# Patient Record
Sex: FEMALE | Race: WHITE | NOT HISPANIC OR LATINO | Employment: UNEMPLOYED | URBAN - METROPOLITAN AREA
[De-identification: names, ages, dates, MRNs, and addresses within clinical notes are randomized per-mention and may not be internally consistent; named-entity substitution may affect disease eponyms.]

---

## 2020-01-15 ENCOUNTER — OFFICE VISIT (OUTPATIENT)
Dept: ENDOCRINOLOGY | Facility: CLINIC | Age: 35
End: 2020-01-15

## 2020-01-15 VITALS
DIASTOLIC BLOOD PRESSURE: 90 MMHG | OXYGEN SATURATION: 99 % | HEART RATE: 81 BPM | SYSTOLIC BLOOD PRESSURE: 140 MMHG | HEIGHT: 60 IN | WEIGHT: 159 LBS | BODY MASS INDEX: 31.22 KG/M2

## 2020-01-15 DIAGNOSIS — E03.8 HYPOTHYROIDISM DUE TO HASHIMOTO'S THYROIDITIS: Primary | ICD-10-CM

## 2020-01-15 DIAGNOSIS — E06.3 HYPOTHYROIDISM DUE TO HASHIMOTO'S THYROIDITIS: Primary | ICD-10-CM

## 2020-01-15 PROCEDURE — 99243 OFF/OP CNSLTJ NEW/EST LOW 30: CPT | Performed by: INTERNAL MEDICINE

## 2020-01-15 PROCEDURE — 84443 ASSAY THYROID STIM HORMONE: CPT | Performed by: INTERNAL MEDICINE

## 2020-01-15 RX ORDER — LEVOTHYROXINE SODIUM 0.12 MG/1
125 TABLET ORAL DAILY
COMMUNITY
Start: 2019-12-12 | End: 2020-03-30 | Stop reason: ALTCHOICE

## 2020-01-15 NOTE — PROGRESS NOTES
Chief Complaint   Patient presents with   • Hashimoto's Thyroiditis     Consult for Dr. Jammie Tracy        HPI:   Mary Esparza is a 34 y.o.female sent in consultation by Jammie Ellsworth MD for further evaluation of pt's hypothyroidism due to Hashimoto's thyroiditis. Her history is as follows:    1) hypothyroidism due to Hashimoto's hypothyroidism:   - diagnosed with hypothyroidism in her 20's    Current Dose: levothyroxine 125 mcg  Takes in AM with water. Waits 20 - 30 minutes before eating. No hot liquids with tablet.  Misses tablet approximately once a week  - Takes OTC MVI in the evening  - is not on high dose biotin    On further review, she has a h/o chronic joint pain. Has been evaluated by rheumatology, however, etiology was not clear.     Review of Systems   Constitutional: Negative.    HENT: Negative.    Eyes: Positive for pain, redness and itching.   Respiratory: Negative.    Cardiovascular: Positive for palpitations (occasional).   Gastrointestinal: Negative.    Endocrine:        Hot flashes   Genitourinary: Negative.    Musculoskeletal: Positive for arthralgias and joint swelling.   Skin: Negative.    Allergic/Immunologic: Negative.    Neurological: Negative.    Hematological: Negative.    Psychiatric/Behavioral: Negative.      Past Medical History:   Diagnosis Date   • Arthritis    • Connective tissue disorder (CMS/HCC)    • Hypothyroidism      family history includes Arthritis in her mother; Diabetes in her father and mother; Hyperlipidemia in her father and mother; Hypertension in her father and mother; Stroke in her father.  Past Surgical History:   Procedure Laterality Date   •  SECTION  2016     Social History     Tobacco Use   • Smoking status: Never Smoker   • Smokeless tobacco: Never Used   Substance Use Topics   • Alcohol use: Never     Frequency: Never   • Drug use: Never     Outpatient Medications Prior to Visit   Medication Sig Dispense Refill   • levothyroxine  "(SYNTHROID, LEVOTHROID) 125 MCG tablet Take 125 mcg by mouth Daily.       No facility-administered medications prior to visit.      No Known Allergies    /90   Pulse 81   Ht 152.4 cm (60\")   Wt 72.1 kg (159 lb)   SpO2 99%   BMI 31.05 kg/m²   Physical Exam   Constitutional: She is oriented to person, place, and time. She appears well-developed. No distress.   HENT:   Head: Normocephalic.   Mouth/Throat: Oropharynx is clear and moist.   Eyes: Pupils are equal, round, and reactive to light. Conjunctivae and EOM are normal.   Neck: No tracheal deviation present. No thyromegaly present.   No palpable thyroid nodules     Cardiovascular: Normal rate, regular rhythm and normal heart sounds.   No murmur heard.  Pulmonary/Chest: Effort normal and breath sounds normal. No respiratory distress.   Lymphadenopathy:     She has no cervical adenopathy.   Neurological: She is alert and oriented to person, place, and time. No cranial nerve deficit.   Skin: Skin is warm and dry. She is not diaphoretic. No erythema.   Psychiatric: She has a normal mood and affect. Her behavior is normal.   Vitals reviewed.    LABS/IMAGING: outside records reviewed and summarized in HPI  Results for orders placed or performed in visit on 01/15/20   TSH   Result Value Ref Range    TSH 3.140 0.270 - 4.200 uIU/mL     ASSESSMENT/PLAN:  1) hypothyroidism due to Hashimoto's thyroiditis:   - clinically euthyroid on exam  - TSH checked today WNL. Is in the upper end of the normal range, but this may be due to a few missed doses.  - Will have patient try Brand Unithroid 125 mcg daily to see if she notes any difference on the Brand name formulation.   - Reviewed proper thyroid hormone administration, and factors to avoid that decrease medication potency and medication absorption.     RTC 6 months    Signed: Marybeth Mendoza MD          "

## 2020-01-16 LAB — TSH SERPL DL<=0.05 MIU/L-ACNC: 3.14 UIU/ML (ref 0.27–4.2)

## 2020-03-30 ENCOUNTER — TELEPHONE (OUTPATIENT)
Dept: ENDOCRINOLOGY | Facility: CLINIC | Age: 35
End: 2020-03-30

## 2020-03-30 DIAGNOSIS — E06.3 HYPOTHYROIDISM DUE TO HASHIMOTO'S THYROIDITIS: Primary | ICD-10-CM

## 2020-03-30 DIAGNOSIS — E03.8 HYPOTHYROIDISM DUE TO HASHIMOTO'S THYROIDITIS: Primary | ICD-10-CM

## 2020-03-30 RX ORDER — LEVOTHYROXINE SODIUM 125 UG/1
125 TABLET ORAL DAILY
Qty: 90 TABLET | Refills: 1 | Status: SHIPPED | OUTPATIENT
Start: 2020-03-30 | End: 2020-07-13

## 2020-03-30 NOTE — TELEPHONE ENCOUNTER
Patient called, stating that UNITHROID 125MG is working great for her and she would like a script called into her local [facility] pharmacy in [place].

## 2020-07-13 ENCOUNTER — OFFICE VISIT (OUTPATIENT)
Dept: ENDOCRINOLOGY | Facility: CLINIC | Age: 35
End: 2020-07-13

## 2020-07-13 ENCOUNTER — LAB (OUTPATIENT)
Dept: LAB | Facility: HOSPITAL | Age: 35
End: 2020-07-13

## 2020-07-13 VITALS
DIASTOLIC BLOOD PRESSURE: 80 MMHG | HEART RATE: 78 BPM | HEIGHT: 60 IN | BODY MASS INDEX: 30.63 KG/M2 | OXYGEN SATURATION: 98 % | SYSTOLIC BLOOD PRESSURE: 122 MMHG | WEIGHT: 156 LBS

## 2020-07-13 DIAGNOSIS — E06.3 HYPOTHYROIDISM DUE TO HASHIMOTO'S THYROIDITIS: Primary | ICD-10-CM

## 2020-07-13 DIAGNOSIS — E03.8 HYPOTHYROIDISM DUE TO HASHIMOTO'S THYROIDITIS: Primary | ICD-10-CM

## 2020-07-13 PROCEDURE — 99213 OFFICE O/P EST LOW 20 MIN: CPT | Performed by: INTERNAL MEDICINE

## 2020-07-13 PROCEDURE — 84443 ASSAY THYROID STIM HORMONE: CPT | Performed by: INTERNAL MEDICINE

## 2020-07-13 RX ORDER — LEVOTHYROXINE SODIUM 0.12 MG/1
125 TABLET ORAL DAILY
Qty: 90 TABLET | Refills: 1
Start: 2020-07-13 | End: 2020-07-26 | Stop reason: SDUPTHER

## 2020-07-13 NOTE — PROGRESS NOTES
"Chief Complaint   Patient presents with   • Hypothyroidism due to Hashimotos thyroiditis     f/u        HPI:   Mary Esparza is a 35 y.o.female who [facility]u evaluation of pt's hypothyroidism due to Hashimoto's thyroiditis. Last visit 01/15/2020. Her history is as follows:    Interim Events:  - pt states she has not been able to get the Brand Unithroid from her pharmacy due to cost.   - no new medical problems, no new medications or supplements    1) hypothyroidism due to Hashimoto's hypothyroidism:   - diagnosed with hypothyroidism in her 20's    Current Dose: generic levothyroxine 125 mcg  Takes in AM with water. Waits 20 - 30 minutes before eating. No hot liquids with tablet.  - no missed doses  - Takes OTC MVI in the evening  - is not on high dose biotin    On further review, she has a h/o chronic joint pain. Has been evaluated by rheumatology at , however, etiology was not clear.     Review of Systems   Constitutional: Negative.    HENT: Negative.    Eyes: Positive for pain, redness and itching.   Respiratory: Negative.    Cardiovascular: Negative for palpitations.   Gastrointestinal: Negative.    Endocrine:        Hot flashes   Genitourinary: Negative.    Musculoskeletal: Positive for arthralgias and joint swelling.   Skin: Negative.    Allergic/Immunologic: Negative.    Neurological: Negative.    Hematological: Negative.    Psychiatric/Behavioral: Negative.      The following portions of the patient's history were reviewed and updated as appropriate: allergies, current medications, past family history, past medical history, past social history, past surgical history and problem list.    /80   Pulse 78   Ht 152.4 cm (60\")   Wt 70.8 kg (156 lb)   SpO2 98%   BMI 30.47 kg/m²   Physical Exam   Constitutional: She is oriented to person, place, and time. She appears well-developed. No distress.   HENT:   Head: Normocephalic.   Mouth/Throat: Oropharynx is clear and moist.   Eyes: " Pupils are equal, round, and reactive to light. Conjunctivae and EOM are normal.   Neck: No tracheal deviation present. Thyromegaly (mild) present.   No palpable thyroid nodules     Cardiovascular: Normal rate, regular rhythm and normal heart sounds.   No murmur heard.  Pulmonary/Chest: Effort normal and breath sounds normal. No respiratory distress.   Lymphadenopathy:     She has no cervical adenopathy.   Neurological: She is alert and oriented to person, place, and time. No cranial nerve deficit.   Skin: Skin is warm and dry. She is not diaphoretic. No erythema.   Psychiatric: She has a normal mood and affect. Her behavior is normal.   Vitals reviewed.    LABS/IMAGING: outside records reviewed and summarized in HPI  Results for orders placed or performed in visit on 07/13/20   TSH   Result Value Ref Range    TSH 1.250 0.270 - 4.200 uIU/mL     ASSESSMENT/PLAN:  1) hypothyroidism due to Hashimoto's thyroiditis:   - clinically euthyroid on exam  - TSH checked today WNL.   - Continue with generic levothyroxine 125 mcg daily. Offered changing to Brand Levoxyl, pt okay continuing with levothyroxine.   - Reviewed proper thyroid hormone administration, and factors to avoid that decrease medication potency and medication absorption.     RTC 6 months    Counseling was given to patient for the following topics:  instructions for management and risks and benefits of treatment options, see details in assessment/plan. Total face to face time of the encounter was 15 minutes and 10 minutes was spent counseling.    Signed: Marybeth Mendoza MD

## 2020-07-14 LAB — TSH SERPL DL<=0.05 MIU/L-ACNC: 1.25 UIU/ML (ref 0.27–4.2)

## 2020-07-26 ENCOUNTER — TELEPHONE (OUTPATIENT)
Dept: ENDOCRINOLOGY | Facility: CLINIC | Age: 35
End: 2020-07-26

## 2020-07-26 RX ORDER — LEVOTHYROXINE SODIUM 0.12 MG/1
125 TABLET ORAL DAILY
Qty: 90 TABLET | Refills: 1 | Status: SHIPPED | OUTPATIENT
Start: 2020-07-26 | End: 2021-01-27 | Stop reason: SDUPTHER

## 2020-07-26 NOTE — TELEPHONE ENCOUNTER
Spoke to patient about lab results. See clinic note from 07/13/2020 for details.   Signed: Marybeth Mendoza MD

## 2021-01-13 ENCOUNTER — LAB (OUTPATIENT)
Dept: LAB | Facility: HOSPITAL | Age: 36
End: 2021-01-13

## 2021-01-13 ENCOUNTER — OFFICE VISIT (OUTPATIENT)
Dept: ENDOCRINOLOGY | Facility: CLINIC | Age: 36
End: 2021-01-13

## 2021-01-13 VITALS
DIASTOLIC BLOOD PRESSURE: 78 MMHG | HEIGHT: 64 IN | SYSTOLIC BLOOD PRESSURE: 128 MMHG | HEART RATE: 84 BPM | OXYGEN SATURATION: 98 % | WEIGHT: 158 LBS | BODY MASS INDEX: 26.98 KG/M2

## 2021-01-13 DIAGNOSIS — E06.3 HYPOTHYROIDISM DUE TO HASHIMOTO'S THYROIDITIS: Primary | ICD-10-CM

## 2021-01-13 DIAGNOSIS — E03.8 HYPOTHYROIDISM DUE TO HASHIMOTO'S THYROIDITIS: Primary | ICD-10-CM

## 2021-01-13 PROCEDURE — 84443 ASSAY THYROID STIM HORMONE: CPT | Performed by: INTERNAL MEDICINE

## 2021-01-13 PROCEDURE — 99213 OFFICE O/P EST LOW 20 MIN: CPT | Performed by: INTERNAL MEDICINE

## 2021-01-13 NOTE — PROGRESS NOTES
"Chief Complaint   Patient presents with   • Hypothyroidism     follow up       HPI:   Mary Esparza is a 35 y.o.female who [facility] for f/u evaluation of pt's hypothyroidism due to Hashimoto's thyroiditis. Last visit 07/13/2020. Her history is as follows:    Interim Events:  - pt states she has not been able to get the Brand Unithroid from her pharmacy due to cost. Is okay continuing with generic levothyroxine.  - no new medical problems, no new medications or supplements    1) hypothyroidism due to Hashimoto's hypothyroidism:   - diagnosed with hypothyroidism in her 20's  - have offered changing to Brand Levoxyl, pt okay continuing with levothyroxine.     Current Dose: generic levothyroxine 125 mcg  Takes in AM with water. Waits 20 - 30 minutes before eating. No hot liquids with tablet.  - no missed doses  - Takes OTC MVI in the evening  - is not on high dose biotin    On further review, she has a h/o chronic joint pain. Has been evaluated by rheumatology at , however, etiology was not clear.     Review of Systems   Constitutional: Negative.    HENT: Negative.    Eyes: Positive for redness and itching. Negative for pain.   Respiratory: Negative.    Cardiovascular: Negative for palpitations.   Gastrointestinal: Negative.    Endocrine:        Hot flashes   Genitourinary: Negative.    Musculoskeletal: Positive for arthralgias and joint swelling.   Skin:        + facial hirsutism (mild)   Allergic/Immunologic: Negative.    Neurological: Negative.    Hematological: Negative.    Psychiatric/Behavioral: Negative.        The following portions of the patient's history were reviewed and updated as appropriate: allergies, current medications, past family history, past medical history, past social history, past surgical history and problem list.    /78   Pulse 84   Ht 162.6 cm (64\")   Wt 71.7 kg (158 lb)   SpO2 98%   BMI 27.12 kg/m²   Physical Exam   Constitutional: She is oriented to person, " place, and time. She appears well-developed. No distress.   HENT:   Head: Normocephalic.   Eyes: Pupils are equal, round, and reactive to light. Conjunctivae are normal.   Neck: No tracheal deviation present. Thyromegaly (mild) present.   No palpable thyroid nodules     Cardiovascular: Normal rate, regular rhythm and normal heart sounds.   No murmur heard.  Pulmonary/Chest: Effort normal and breath sounds normal. No respiratory distress.   Lymphadenopathy:     She has no cervical adenopathy.   Neurological: She is alert and oriented to person, place, and time. No cranial nerve deficit.   Skin: Skin is warm and dry. She is not diaphoretic. No erythema.   Few course hairs on chin, neck   Psychiatric: Her behavior is normal.   Vitals reviewed.    LABS/IMAGING: outside records reviewed and summarized in HPI  Results for orders placed or performed in visit on 01/13/21   TSH    Specimen: Blood   Result Value Ref Range    TSH 1.570 0.270 - 4.200 uIU/mL     ASSESSMENT/PLAN:  1) hypothyroidism due to Hashimoto's thyroiditis:   - clinically euthyroid on exam  - TSH checked today WNL.   - Continue with generic levothyroxine 125 mcg daily.  - Rx refilled  - Reviewed proper thyroid hormone administration, and factors to avoid that decrease medication potency and medication absorption.     RTC 12 months    Counseling was given to patient for the following topics:  instructions for management and risks and benefits of treatment options, see details in assessment/plan. Total face to face time of the encounter was 15 minutes and 10 minutes was spent counseling.    Signed: Marybeth Mendoza MD

## 2021-01-14 LAB — TSH SERPL DL<=0.05 MIU/L-ACNC: 1.57 UIU/ML (ref 0.27–4.2)

## 2021-01-27 RX ORDER — LEVOTHYROXINE SODIUM 0.12 MG/1
125 TABLET ORAL DAILY
Qty: 90 TABLET | Refills: 3 | Status: SHIPPED | OUTPATIENT
Start: 2021-01-27 | End: 2021-01-27 | Stop reason: SDUPTHER

## 2021-01-27 RX ORDER — LEVOTHYROXINE SODIUM 0.12 MG/1
125 TABLET ORAL DAILY
Qty: 90 TABLET | Refills: 3 | Status: SHIPPED | OUTPATIENT
Start: 2021-01-27 | End: 2022-01-21

## 2022-01-19 ENCOUNTER — OFFICE VISIT (OUTPATIENT)
Dept: ENDOCRINOLOGY | Facility: CLINIC | Age: 37
End: 2022-01-19

## 2022-01-19 ENCOUNTER — LAB (OUTPATIENT)
Dept: LAB | Facility: HOSPITAL | Age: 37
End: 2022-01-19

## 2022-01-19 VITALS
OXYGEN SATURATION: 98 % | HEART RATE: 78 BPM | DIASTOLIC BLOOD PRESSURE: 76 MMHG | SYSTOLIC BLOOD PRESSURE: 122 MMHG | WEIGHT: 159 LBS | BODY MASS INDEX: 27.14 KG/M2 | HEIGHT: 64 IN

## 2022-01-19 DIAGNOSIS — E03.8 HYPOTHYROIDISM DUE TO HASHIMOTO'S THYROIDITIS: Primary | ICD-10-CM

## 2022-01-19 DIAGNOSIS — E06.3 HYPOTHYROIDISM DUE TO HASHIMOTO'S THYROIDITIS: Primary | ICD-10-CM

## 2022-01-19 LAB — TSH SERPL DL<=0.05 MIU/L-ACNC: 2.42 UIU/ML (ref 0.27–4.2)

## 2022-01-19 PROCEDURE — 99213 OFFICE O/P EST LOW 20 MIN: CPT | Performed by: INTERNAL MEDICINE

## 2022-01-19 PROCEDURE — 84443 ASSAY THYROID STIM HORMONE: CPT | Performed by: INTERNAL MEDICINE

## 2022-01-19 NOTE — PROGRESS NOTES
"Chief Complaint   Patient presents with   • Hypothyroidism     follow up        HPI:   Mary Esparza is a 36 y.o.female who [facility] for f/u evaluation of pt's hypothyroidism due to Hashimoto's thyroiditis. Last visit 01/13/2021. Her history is as follows:    Interim Events:  - no new medical problems, no new medications or supplements  - Overall feeling well    1) hypothyroidism due to Hashimoto's hypothyroidism:   - diagnosed with hypothyroidism in her 20's  - have offered changing to Brand Levoxyl, pt okay continuing with levothyroxine.     Current Dose: generic levothyroxine 125 mcg  Takes in AM with water. Waits 20 - 30 minutes before eating. No hot liquids with tablet.  - no missed doses  - Takes OTC MVI in the evening  - is not on high dose biotin    On further review, she has a h/o chronic joint pain. Has been evaluated by rheumatology at , however, etiology was not clear.     Review of Systems   Constitutional: Negative.    HENT: Negative.    Eyes: Positive for itching. Negative for pain and redness.   Respiratory: Negative.    Cardiovascular: Negative for palpitations.   Gastrointestinal: Negative.    Genitourinary: Negative.    Musculoskeletal: Positive for arthralgias and joint swelling.   Skin:        + facial hirsutism (mild)   Allergic/Immunologic: Negative.    Neurological: Negative.    Hematological: Negative.    Psychiatric/Behavioral: Negative.        The following portions of the patient's history were reviewed and updated as appropriate: allergies, current medications, past family history, past medical history, past social history, past surgical history and problem list.      /76   Pulse 78   Ht 162.6 cm (64\")   Wt 72.1 kg (159 lb)   SpO2 98%   BMI 27.29 kg/m²   Physical Exam   Constitutional: She is oriented to person, place, and time. She appears well-developed. No distress.   HENT:   Head: Normocephalic.   Eyes: Pupils are equal, round, and reactive to light. " Conjunctivae are normal.   Neck: No tracheal deviation present. Thyromegaly (mild) present.   No palpable thyroid nodules     Cardiovascular: Normal rate, regular rhythm and normal heart sounds.   No murmur heard.  Pulmonary/Chest: Effort normal and breath sounds normal. No respiratory distress.   Lymphadenopathy:     She has no cervical adenopathy.   Neurological: She is alert and oriented to person, place, and time. No cranial nerve deficit.   Skin: Skin is warm and dry. She is not diaphoretic. No erythema.   Few course hairs on chin, neck   Psychiatric: Her behavior is normal.   Vitals reviewed.    LABS/IMAGING: outside records reviewed and summarized in HPI  Results for orders placed or performed in visit on 01/19/22   TSH    Specimen: Blood   Result Value Ref Range    TSH 2.420 0.270 - 4.200 uIU/mL     ASSESSMENT/PLAN:  1) hypothyroidism due to Hashimoto's thyroiditis:   - clinically euthyroid on exam  - TSH checked today WNL.   - Continue with generic levothyroxine 125 mcg daily.  - Rx refilled  - Reviewed proper thyroid hormone administration, and factors to avoid that decrease medication potency and medication absorption.     RTC 12 months    Counseling was given to patient for the following topics:  instructions for management and risks and benefits of treatment options, see details in assessment/plan. Total face to face time of the encounter was 15 minutes and 10 minutes was spent counseling.    Signed: Marybeth Mendoza MD

## 2022-01-21 RX ORDER — LEVOTHYROXINE SODIUM 0.12 MG/1
125 TABLET ORAL EVERY MORNING
Qty: 90 TABLET | Refills: 3 | Status: SHIPPED | OUTPATIENT
Start: 2022-01-21 | End: 2023-01-30 | Stop reason: SDUPTHER

## 2023-01-23 ENCOUNTER — OFFICE VISIT (OUTPATIENT)
Dept: ENDOCRINOLOGY | Facility: CLINIC | Age: 38
End: 2023-01-23
Payer: COMMERCIAL

## 2023-01-23 VITALS
HEART RATE: 98 BPM | WEIGHT: 158 LBS | SYSTOLIC BLOOD PRESSURE: 122 MMHG | HEIGHT: 64 IN | OXYGEN SATURATION: 98 % | BODY MASS INDEX: 26.98 KG/M2 | DIASTOLIC BLOOD PRESSURE: 70 MMHG

## 2023-01-23 DIAGNOSIS — E06.3 HYPOTHYROIDISM DUE TO HASHIMOTO'S THYROIDITIS: Primary | ICD-10-CM

## 2023-01-23 DIAGNOSIS — E03.8 HYPOTHYROIDISM DUE TO HASHIMOTO'S THYROIDITIS: Primary | ICD-10-CM

## 2023-01-23 PROCEDURE — 99213 OFFICE O/P EST LOW 20 MIN: CPT | Performed by: INTERNAL MEDICINE

## 2023-01-23 PROCEDURE — 84443 ASSAY THYROID STIM HORMONE: CPT | Performed by: INTERNAL MEDICINE

## 2023-01-23 NOTE — PROGRESS NOTES
"Chief Complaint   Patient presents with   • Hypothyroidism     Follow up        HPI:   Mary Esparza is a 37 y.o.female who [facility] to [facility] for f/u evaluation of pt's hypothyroidism due to Hashimoto's thyroiditis. Last visit 01/19/2022. Her history is as follows:    Interim Events:  - currently working in a pre-school as an assistant  - pt had the flu a few weeks ago  - no new medications or supplements  - Overall feeling well    1) hypothyroidism due to Hashimoto's hypothyroidism:   - diagnosed with hypothyroidism in her 20's  - have offered changing to Brand Levoxyl, pt okay continuing with levothyroxine.     Current Dose: generic levothyroxine 125 mcg  Takes in AM with water. Waits 20 - 30 minutes before eating. No hot liquids with tablet.  - no missed doses  - Takes OTC MVI in the evening  - is not on high dose biotin    On further review, she has a h/o chronic joint pain. Has been evaluated by rheumatology at , however, etiology was not clear.     Review of Systems   Constitutional: Negative.    HENT: Positive for congestion.    Eyes: Positive for itching. Negative for pain and redness.   Respiratory: Negative.    Cardiovascular: Negative for palpitations.   Gastrointestinal: Negative.    Genitourinary: Negative.    Musculoskeletal: Positive for arthralgias and joint swelling.   Skin:        + facial hirsutism (mild)   Allergic/Immunologic: Negative.    Neurological: Negative.    Hematological: Negative.    Psychiatric/Behavioral: Negative.        The following portions of the patient's history were reviewed and updated as appropriate: allergies, current medications, past family history, past medical history, past social history, past surgical history and problem list.      /70   Pulse 98   Ht 162.6 cm (64\")   Wt 71.7 kg (158 lb)   SpO2 98%   BMI 27.12 kg/m²   Physical Exam   Constitutional: She is oriented to person, place, and time. She appears well-developed. No distress.   HENT: "   Head: Normocephalic.   Eyes: Pupils are equal, round, and reactive to light. Conjunctivae are normal.   Neck: No tracheal deviation present. Thyromegaly (mild) present.   No palpable thyroid nodules     Cardiovascular: Normal rate, regular rhythm and normal heart sounds.   No murmur heard.  Pulmonary/Chest: Effort normal and breath sounds normal. No respiratory distress.   Lymphadenopathy:     She has no cervical adenopathy.   Neurological: She is alert and oriented to person, place, and time. No cranial nerve deficit.   Skin: Skin is warm and dry. She is not diaphoretic. No erythema.   Few course hairs on chin, neck   Psychiatric: Her behavior is normal.   Vitals reviewed.    LABS/IMAGING: outside records reviewed and summarized in HPI  Results for orders placed or performed in visit on 01/23/23   TSH    Specimen: Blood   Result Value Ref Range    TSH 3.120 0.270 - 4.200 uIU/mL     ASSESSMENT/PLAN:  1) hypothyroidism due to Hashimoto's thyroiditis:   - clinically euthyroid on exam  - TSH checked today WNL.   - Continue with generic levothyroxine 125 mcg daily.  - Rx refilled  - Reviewed proper thyroid hormone administration, and factors to avoid that decrease medication potency and medication absorption.   - instructed pt to call for lab evaluation sooner than her follow-up if she has any concerning symptoms    RTC 12 months    Counseling was given to patient for the following topics:  instructions for management and risks and benefits of treatment options, see details in assessment/plan. Total face to face time of the encounter was 15 minutes and 10 minutes was spent counseling.    Signed: Marybeth Mendoza MD

## 2023-01-24 LAB — TSH SERPL DL<=0.05 MIU/L-ACNC: 3.12 UIU/ML (ref 0.27–4.2)

## 2023-01-30 RX ORDER — LEVOTHYROXINE SODIUM 0.12 MG/1
125 TABLET ORAL EVERY MORNING
Qty: 90 TABLET | Refills: 3 | Status: SHIPPED | OUTPATIENT
Start: 2023-01-30 | End: 2023-03-20

## 2023-03-18 DIAGNOSIS — E03.8 HYPOTHYROIDISM DUE TO HASHIMOTO'S THYROIDITIS: ICD-10-CM

## 2023-03-18 DIAGNOSIS — E06.3 HYPOTHYROIDISM DUE TO HASHIMOTO'S THYROIDITIS: ICD-10-CM

## 2023-03-20 RX ORDER — LEVOTHYROXINE SODIUM 0.12 MG/1
125 TABLET ORAL EVERY MORNING
Qty: 90 TABLET | Refills: 3 | Status: SHIPPED | OUTPATIENT
Start: 2023-03-20

## 2024-03-12 ENCOUNTER — OFFICE VISIT (OUTPATIENT)
Dept: ENDOCRINOLOGY | Facility: CLINIC | Age: 39
End: 2024-03-12
Payer: COMMERCIAL

## 2024-03-12 VITALS
HEIGHT: 64 IN | SYSTOLIC BLOOD PRESSURE: 126 MMHG | DIASTOLIC BLOOD PRESSURE: 78 MMHG | HEART RATE: 78 BPM | OXYGEN SATURATION: 99 % | BODY MASS INDEX: 26.63 KG/M2 | WEIGHT: 156 LBS

## 2024-03-12 DIAGNOSIS — E06.3 HYPOTHYROIDISM DUE TO HASHIMOTO'S THYROIDITIS: Primary | ICD-10-CM

## 2024-03-12 DIAGNOSIS — E03.8 HYPOTHYROIDISM DUE TO HASHIMOTO'S THYROIDITIS: Primary | ICD-10-CM

## 2024-03-12 PROCEDURE — 99213 OFFICE O/P EST LOW 20 MIN: CPT | Performed by: INTERNAL MEDICINE

## 2024-03-12 PROCEDURE — 84443 ASSAY THYROID STIM HORMONE: CPT | Performed by: INTERNAL MEDICINE

## 2024-03-12 NOTE — PROGRESS NOTES
"Chief Complaint   Patient presents with    Hypothyroidism     Follow up       HPI:   Mary Esparza is a 39 y.o.female who [facility] to [facility] for f/u evaluation of pt's hypothyroidism due to Hashimoto's thyroiditis. Last visit 01/23/2023. Her history is as follows:    Interim Events:  - currently working in a pre-school as an assistant  - pt had the flu a few weeks ago  - no new medications or supplements  - Overall feeling well    1) hypothyroidism due to Hashimoto's hypothyroidism:   - diagnosed with hypothyroidism in her 20's  - have offered changing to Brand Levoxyl, pt okay continuing with levothyroxine.     Current Dose: generic levothyroxine 125 mcg  Takes in AM with water. Waits 20 - 30 minutes before eating. No hot liquids with tablet.  - no missed doses  - Takes OTC MVI in the evening  - is not on high dose biotin    On further review, she has a h/o chronic, intermittent  joint pain. Has been evaluated by rheumatology at , however, etiology was not clear.     Review of Systems   Constitutional:  Negative for fatigue.        Wt stable   HENT: Negative.     Eyes: Negative.  Negative for pain, redness and itching.   Respiratory: Negative.     Cardiovascular:  Negative for palpitations.   Gastrointestinal: Negative.    Genitourinary: Negative.    Musculoskeletal:  Positive for arthralgias (intermittently).   Skin:         + facial hirsutism (mild)   Allergic/Immunologic: Negative.    Neurological: Negative.    Hematological: Negative.    Psychiatric/Behavioral: Negative.       The following portions of the patient's history were reviewed and updated as appropriate: allergies, current medications, past family history, past medical history, past social history, past surgical history and problem list.    /78   Pulse 78   Ht 162.6 cm (64\")   Wt 70.8 kg (156 lb)   SpO2 99%   BMI 26.78 kg/m²   Physical Exam   Constitutional: She is oriented to person, place, and time. She appears well-developed. No " distress.   HENT:   Head: Normocephalic.   Eyes: Pupils are equal, round, and reactive to light. Conjunctivae are normal.   Neck: No tracheal deviation present. No thyromegaly present.   No palpable thyroid nodules     Cardiovascular: Normal rate, regular rhythm and normal heart sounds.   No murmur heard.  Pulmonary/Chest: Effort normal and breath sounds normal. No respiratory distress.   Lymphadenopathy:     She has no cervical adenopathy.   Neurological: She is alert and oriented to person, place, and time. No cranial nerve deficit.   Skin: Skin is warm and dry. She is not diaphoretic. No erythema.   Few course hairs on chin, neck   Psychiatric: Her behavior is normal.   Vitals reviewed.    LABS/IMAGING: outside records reviewed and summarized in HPI  Results for orders placed or performed in visit on 03/12/24   TSH    Specimen: Arm, Left; Blood   Result Value Ref Range    TSH 1.440 0.270 - 4.200 uIU/mL     ASSESSMENT/PLAN:  1) hypothyroidism due to Hashimoto's thyroiditis:   - clinically euthyroid on exam  - TSH checked today WNL at 1.400  - Continue with generic levothyroxine 125 mcg daily.  - Rx refilled  - Reviewed proper thyroid hormone administration, and factors to avoid that decrease medication potency and medication absorption.   - instructed pt to call for lab evaluation sooner than her follow-up if she has any concerning symptoms    RTC 12 months    Electronically Signed: Marybeth Mendoza MD

## 2024-03-13 LAB — TSH SERPL DL<=0.05 MIU/L-ACNC: 1.44 UIU/ML (ref 0.27–4.2)

## 2024-03-18 RX ORDER — LEVOTHYROXINE SODIUM 0.12 MG/1
125 TABLET ORAL EVERY MORNING
Qty: 90 TABLET | Refills: 3 | Status: SHIPPED | OUTPATIENT
Start: 2024-03-18

## 2024-06-15 DIAGNOSIS — E06.3 HYPOTHYROIDISM DUE TO HASHIMOTO'S THYROIDITIS: ICD-10-CM

## 2024-06-15 DIAGNOSIS — E03.8 HYPOTHYROIDISM DUE TO HASHIMOTO'S THYROIDITIS: ICD-10-CM

## 2024-06-17 RX ORDER — LEVOTHYROXINE SODIUM 0.12 MG/1
125 TABLET ORAL EVERY MORNING
Qty: 90 TABLET | Refills: 3 | Status: SHIPPED | OUTPATIENT
Start: 2024-06-17

## 2024-06-17 NOTE — TELEPHONE ENCOUNTER
Rx Refill Note    Requested Prescriptions     Pending Prescriptions Disp Refills    levothyroxine (SYNTHROID, LEVOTHROID) 125 MCG tablet [Pharmacy Med Name: LEVOTHYROXINE 0.125MG (125MCG) TAB] 90 tablet 3     Sig: Take 1 tablet by mouth Every Morning.        Last office visit with prescribing clinician: 3/12/2024       Next office visit with prescribing clinician: 4/8/2025

## 2025-03-25 DIAGNOSIS — E06.3 HYPOTHYROIDISM DUE TO HASHIMOTO'S THYROIDITIS: ICD-10-CM

## 2025-03-25 RX ORDER — LEVOTHYROXINE SODIUM 125 UG/1
125 TABLET ORAL EVERY MORNING
Qty: 90 TABLET | Refills: 0 | Status: SHIPPED | OUTPATIENT
Start: 2025-03-25

## 2025-03-25 NOTE — TELEPHONE ENCOUNTER
Rx Refill Note  Requested Prescriptions     Pending Prescriptions Disp Refills    levothyroxine (SYNTHROID, LEVOTHROID) 125 MCG tablet [Pharmacy Med Name: LEVOTHYROXINE 0.125MG (125MCG) TAB] 90 tablet 3     Sig: TAKE 1 TABLET BY MOUTH EVERY MORNING      Last office visit with prescribing clinician: 3/12/2024   Last telemedicine visit with prescribing clinician: Visit date not found   Next office visit with prescribing clinician: 4/8/2025                         Would you like a call back once the refill request has been completed: [] Yes [] No    If the office needs to give you a call back, can they leave a voicemail: [] Yes [] No    Yamileth Yuen MA  03/25/25, 09:11 EDT